# Patient Record
Sex: FEMALE | Race: WHITE
[De-identification: names, ages, dates, MRNs, and addresses within clinical notes are randomized per-mention and may not be internally consistent; named-entity substitution may affect disease eponyms.]

---

## 2017-01-09 ENCOUNTER — HOSPITAL ENCOUNTER (EMERGENCY)
Dept: HOSPITAL 10 - FTE | Age: 42
Discharge: HOME | End: 2017-01-09
Payer: COMMERCIAL

## 2017-01-09 VITALS
WEIGHT: 202.83 LBS | HEIGHT: 68 IN | HEIGHT: 68 IN | BODY MASS INDEX: 30.74 KG/M2 | WEIGHT: 202.83 LBS | BODY MASS INDEX: 30.74 KG/M2

## 2017-01-09 VITALS
TEMPERATURE: 100.3 F | RESPIRATION RATE: 20 BRPM | DIASTOLIC BLOOD PRESSURE: 67 MMHG | SYSTOLIC BLOOD PRESSURE: 132 MMHG | HEART RATE: 88 BPM

## 2017-01-09 DIAGNOSIS — F17.210: ICD-10-CM

## 2017-01-09 DIAGNOSIS — J02.0: Primary | ICD-10-CM

## 2017-01-09 PROCEDURE — 99283 EMERGENCY DEPT VISIT LOW MDM: CPT

## 2017-01-09 PROCEDURE — 81003 URINALYSIS AUTO W/O SCOPE: CPT

## 2017-01-09 NOTE — ERD
ER Documentation


Chief Complaint


Date/Time


DATE: 1/9/17 


TIME: 22:08


Chief Complaint


SORE THROAT SINCE FRIDAY. BOTH KIDS WITH STREP LAST WEEK





HPI


Patient is a 41-year-old female who presents to the emergency department with a 

sore throat 4 days. Patient states that her throat appears erythematous and 

she is having dysphagia. Patient is able to tolerate by mouth fluids. She has 

no muffled voice, no trismus, no drooling and is speaking in full sentences. 

Patient reports of fever which started yesterday. She reports a T max of 101.2F 

this morning around 8 AM. She took ibuprofen 800 mg and hydrocodone at that 

time. Patient denies any nausea, vomiting, abdominal pain, diarrhea, ear pain, 

cough. She does report some malodorous urine 2 days. Patient denies any 

frequency, urgency. Patient denies any recent travel. Patient's son was 

diagnosed with strep throat last week.





ROS


All systems reviewed and are negative except as per history of present illness.





Medications


Home Meds


Active Scripts


Acetaminophen* (Tylophen*) 500 Mg Capsule, 1 CAP PO Q6H Y for PAIN AND OR 

ELEVATED TEMP, #20 CAP


   Prov:YAYO HILLIARD PA-C         1/9/17


Amoxicillin* (Amoxicillin*) 500 Mg Cap, 500 MG PO BID for 10 Days, CAP


   Prov:YAYO HILLIARD PA-C         1/9/17


Ondansetron Hcl* (Zofran* ODT) 4 mg -ODT Tab.disper, 4 MG PO Q8 Y for NAUSEA 

AND OR VOMITING, #30 TAB


   Prov:KASSIDY CALL NP         1/20/16


Diazepam* (Diazepam*) 5 Mg Tablet, 5 MG PO Q8 Y for dizziness vertigo symptoms, 

#10 TAB


   Prov:KASSIDY CALL NP         1/20/16


Reported Medications


Ondansetron Hcl* (Zofran* ODT) Unknown Strength Tab.disper, PO Q6 Y for NAUSEA 

AND/OR VOMITING, #10 TAB


   1/20/16


Meclizine Hcl* (Antivert*) Unknown Strength Tablet, PO Q6H for DIZZINESS, #20 

TAB


   1/20/16


[Prenatal Vits]   No Conflict Check


   1/10/13





Allergies


Allergies:  


Coded Allergies:  


     morphine (Verified  Allergy, Mild, ITCHING, 1/17/14)





PMhx/Soc


Medical and Surgical Hx:  pt denies Medical Hx


History of Surgery:  Yes (C section x3, and gall bladder removal)


Anesthesia Reaction:  No


Hx Neurological Disorder:  No


Hx Respiratory Disorders:  No


Hx Cardiac Disorders:  No


Hx Psychiatric Problems:  No


Hx Miscellaneous Medical Probl:  No


Hx Alcohol Use:  Yes


Hx Substance Use:  No


Hx Tobacco Use:  Yes


Smoking Status:  Current every day smoker





Physical Exam


Vitals





Vital Signs








  Date Time  Temp Pulse Resp B/P Pulse Ox O2 Delivery O2 Flow Rate FiO2


 


1/9/17 23:16 100.3 88 20 132/67 98 Room Air  


 


1/9/17 19:55 100.6 97 18 166/80 100   








Physical Exam


GENERAL: Well-developed, well-nourished female. Appears in no acute distress. 

Speaking in full sentences without any difficulty.


HEAD: Normocephalic, atraumatic. No deformities or ecchymosis. 


EYE: Pupils equal, round, and reactive to light. EOMs intact. No conjunctival 

erythema. No scleral icterus. No eye discharge. 


ENT: External ear without any masses or tenderness. Auditory canals clear 

bilaterally. TM visualized bilaterally, non-erythematous, non-bulging. Nasal 

mucosa pink with no discharge. Oropharynx is erythematous with tonsillar 

swelling bilaterally. Left tonsil with exudates. No uvula deviation. No kissing 

tonsils. No mastoid tenderness bilateral.


NECK: Supple. No lymphadenopathy or thyromegaly. No meningismus. Trachea 

midline.


LUNG: Clear to auscultation bilaterally. No rhonchi, wheezing, rales or coarse 

breath sounds. 


HEART: Regular rate and rhythm. No murmurs, rubs or gallops.


ABDOMEN: Soft, nondistended. Tender to palpation in the suprapubic region. 

Positive bowel sounds in all four quadrants. No rebound tenderness, no 

guarding. (-) McBurney's point tenderness.  No CVA tenderness.


BACK: No midline tenderness. 


EXTREMITIES: Equal pulses bilaterally. No peripheral clubbing, cyanosis or 

edema. No unilateral leg swelling.  


NEUROLOGIC: Alert and oriented to person, place and time. Moving all four 

extremities. 5/5 strength in all extremities. Normal speech. Steady gait. (-) 

Brudzinski sign- no flexion of the hips and knees noted with neck flexion. (-) 

Kernigs sign- patient able to extend knee to 180 degrees with hip flexion, no 

hamstring stiffness noted. 


SKIN: Normal color. Warm and dry. No rashes or lesions.


Results 24 hrs





 Laboratory Tests








Test


  1/9/17


22:56


 


Bedside Urine Blood Trace-intact 


 


Bedside Urine Glucose (UA) Negative 


 


Bedside Urine Ketones (LAB) Negative 


 


Bedside Urine Leukocyte


Esterase (L Negative 


 


 


Bedside Urine Nitrite (LAB) Negative 


 


Bedside Urine Protein (LAB) Negative 


 


Bedside Urine pH (LAB) 7.5 








 Current Medications








 Medications


  (Trade)  Dose


 Ordered  Sig/Zeke


 Route


 PRN Reason  Start Time


 Stop Time Status Last Admin


Dose Admin


 


 Acetaminophen


  (Tylenol Tab)  1,000 mg  ONCE  STAT


 PO


   1/9/17 22:04


 1/9/17 22:51 DC  


 











Procedures/MDM


ED COURSE:


The patient was stable throughout ED course. I kept the patient and/or family 

informed of laboratory and diagnostic imaging results throughout the ED course.

  








MEDICAL DECISION MAKING:


This is a 41-year-old female who presents with throat pain and fevers. Vital 

signs were reviewed. Patient had a temperature of 100.6F at initial 

presentation. Patient was offered Tylenol here in the emergency department for 

her fever but the patient refused stating that she can take the medication at 

home. Patient was not hypoxic. The patient does not have trismus, muffled voice

, uvula deviation, unilateral tonsillar swelling, or drooling. No signs of neck 

swelling or hyperextension of the neck noted. ENT exam revealed erythematous 

oropharynx with bilateral tonsillar erythema and swelling, left tonsil noted to 

have exudates. Given these findings, the patients presentation is most 

consistent with strep pharyngitis. I have a much lower clinical suspicion for 

epiglottitis, peritonsillar abscess, retropharyngeal abscess, Ludwigs angina, 

acute bronchitis, dental abscess, influenza or pneumonia. Urine dip was 

negative for infection or hematuria. Low suspicion for UTI or pyelonephritis. 








PRESCRIPTIONS:


Amoxicillin. Patient advised to take full course of antibiotics. 


Tylenol for pain/fever.





DISCHARGE:


At this time, patient is stable for discharge and outpatient management. 

Supportive therapies such as OTC throat lozenges and warm salt water gurgles 

were discussed. I have instructed the patient to follow-up with his/her primary 

care physician in 1-2 days. I have discussed with the patient the possibility 

of needing to see a specialist for further workup and imaging studies if 

symptoms persist. I have instructed the patient to promptly return to the ER 

for any new or worsening symptoms including increased pain, fever, nausea, 

vomiting, weakness or LOC. The patient and/or family expressed understanding of 

and agreement with this plan. All questions were answered. Home care 

instructions were provided.





Departure


Diagnosis:  


 Primary Impression:  


 Strep throat


Condition:  Stable


Patient Instructions:  Pharyngitis, Strep (Presumed)





Additional Instructions:  


They have access prescribed. Drink lots of fluids. Take Tylenol for fever/pain 

control.





Call your primary care doctor TOMORROW for an appointment during the next 1-2 

days.See the doctor sooner or return here if your condition worsens before your 

appointment time.











YAYO HILLIARD PA-C Jan 9, 2017 22:16

## 2017-04-12 ENCOUNTER — HOSPITAL ENCOUNTER (EMERGENCY)
Dept: HOSPITAL 10 - FTE | Age: 42
Discharge: HOME | End: 2017-04-12
Payer: COMMERCIAL

## 2017-04-12 VITALS — BODY MASS INDEX: 45.22 KG/M2 | WEIGHT: 195.42 LBS | HEIGHT: 55 IN

## 2017-04-12 DIAGNOSIS — M25.522: Primary | ICD-10-CM

## 2017-04-12 DIAGNOSIS — Z04.3: ICD-10-CM

## 2017-04-12 PROCEDURE — 73080 X-RAY EXAM OF ELBOW: CPT

## 2017-04-12 PROCEDURE — 96372 THER/PROPH/DIAG INJ SC/IM: CPT

## 2017-04-12 NOTE — ERD
ER Documentation


Chief Complaint


Date/Time


DATE: 17 


TIME: 14:48


Chief Complaint


l. elbow pain s/p fall





HPI


Patient is a 41-year-old female who presents to the ED with left elbow pain 

after sustaining a fall 1 week ago.  She states that she has pain to the tip of 

her elbow.  She states that it was swollen but the swelling has decreased.  She 

has been using ice.  She is able to move her her elbow and shoulder but she 

states that it is tender to touch when she hits her elbow or places her elbow 

on a hard surface.  Denies numbness or tingling.  Denies pain above or below 

her elbow joint.  Denies fever or chills.  She states that she took half a 

tablet of hydrocodone which helped with her symptoms.  Up-to-date with her 

immunizations no other complaints.





ROS


All systems reviewed and are negative except as per history of present illness.





Medications


Home Meds


Active Scripts


Naproxen* (Naprosyn*) 500 Mg Tablet, 500 MG PO BID Y for PAIN AND/OR 

INFLAMMATION, #30 TAB


   Prov:JALEN ADAIR PA-C         17


Acetaminophen* (Tylophen*) 500 Mg Capsule, 1 CAP PO Q6H Y for PAIN AND OR 

ELEVATED TEMP, #20 CAP


   Prov:YAYO HILLIARD PA-C         17


Amoxicillin* (Amoxicillin*) 500 Mg Cap, 500 MG PO BID for 10 Days, CAP


   Prov:YAYO HILLIARD PA-C         17


Ondansetron Hcl* (Zofran* ODT) 4 mg -ODT Tab.disper, 4 MG PO Q8 Y for NAUSEA 

AND OR VOMITING, #30 TAB


   Prov:KASSIDY CALL NP         16


Diazepam* (Diazepam*) 5 Mg Tablet, 5 MG PO Q8 Y for dizziness vertigo symptoms, 

#10 TAB


   Prov:KASSIDY CALL NP         16


Reported Medications


Ondansetron Hcl* (Zofran* ODT) Unknown Strength Tab.disper, PO Q6 Y for NAUSEA 

AND/OR VOMITING, #10 TAB


   16


Meclizine Hcl* (Antivert*) Unknown Strength Tablet, PO Q6H for DIZZINESS, #20 

TAB


   16


[Prenatal Vits]   No Conflict Check


   1/10/13





Allergies


Allergies:  


Coded Allergies:  


     morphine (Verified  Allergy, Mild, ITCHING, 14)





PMhx/Soc


History of Surgery:  Yes (C section x3, and gall bladder removal)


Anesthesia Reaction:  No


Hx Neurological Disorder:  No


Hx Respiratory Disorders:  No


Hx Cardiac Disorders:  No


Hx Psychiatric Problems:  No


Hx Miscellaneous Medical Probl:  No


Hx Alcohol Use:  Yes


Hx Substance Use:  No


Hx Tobacco Use:  Yes


Smoking Status:  Never smoker





FmHx


Family History:  No coronary disease, No diabetes, No other





Physical Exam


Vitals





Vital Signs








  Date Time  Temp Pulse Resp B/P Pulse Ox O2 Delivery O2 Flow Rate FiO2


 


17 13:42 98.7 82 20 131/76 100   








Physical Exam





GENERAL: Well-developed, well-nourished female. Appears in no acute distress. 


LUNG: Clear to auscultation bilaterally. No rhonchi, wheezing, rales or coarse 

breath sounds. 


HEART: Regular rate and rhythm. No murmurs, rubs or gallops.


ABDOMEN: No scars, ecchymosis or rashes noted. Soft, nontender, and 

nondistended. Positive bowel sounds in all four quadrants. No rebound tenderness

, no guarding. (-) McBurneys point tenderness. No CVA tenderness. 


BACK: No midline tenderness. 


Extremities: Equal pulses bilaterally. No peripheral clubbing, cyanosis or 

edema. No unilateral leg swelling.  No step-offs or deformities.  No open 

wounds or lacerations.  Tenderness to the olecranon.  No bruising or ecchymosis 

or redness or warmth.  No pain above or below the elbow joint.  No snuffbox 

tenderness.  Range of motion intact.  Pulses intact.  Radius, ulnar and median 

nerve intact.


NEUROLOGIC: Alert and oriented. Moving all four extremities. 5/5 strength in 

all extremities. Normal speech. Steady gait. 


SKIN: Normal color. Warm and dry. No rashes or lesions. Capillary refill < 2 

seconds


Results 24 hrs





 Current Medications








 Medications


  (Trade)  Dose


 Ordered  Sig/Zeke


 Route


 PRN Reason  Start Time


 Stop Time Status Last Admin


Dose Admin


 


 Ketorolac


 Tromethamine


  (Toradol)  30 mg  ONCE  STAT


 IM


   17 13:56


 17 13:58 DC 17 14:10


 











Procedures/MDM


ER COURSE:


I kept the patient and/or family informed of laboratory and diagnostic imaging 

results throughout the emergency room course.





MEDICATIONS


Toradol.  Tolerated well with no adverse reaction.





IMAGING STUDIES


 [place] [id number]


 Radiology Main Line: [phone]





 DIAGNOSTIC IMAGING REPORT





 Patient: LILY STRICKLAND   : 1975   Age: 41  Sex: F                  

      


 MR #:    [id number]   Acct #:   [id number]    DOS: 17 1356


 Ordering MD: JALEN ADAIR PA-C   Location:  FTE   Room/Bed:           

                                 


 








PROCEDURE:   XR left elbow. 


 


CLINICAL INDICATION:   Trauma due to a fall.  Left elbow pain. 


 


TECHNIQUE:   3 views.  Frontal, lateral, and oblique.


 


COMPARISON:   No prior study is available for comparison.   


 


FINDINGS:


There is no fracture or dislocation.


The soft tissues are normal.


Articular surfaces are intact.


There is no lytic or blastic lesion.


There is no radiopaque foreign body. 


 


IMPRESSION:


1.  Unremarkable images of the left elbow.  


 


RPTAT: QQ


_____________________________________________ 


.Josafat Gomez MD, MD           Date    Time 


Electronically viewed and signed by .Josafat Gomez MD, MD on 2017 15:15 


 


D:  2017 15:15  T:  2017 15:15


.R/





CC: JALEN ADAIR PA-C








MEDICAL DECISION MAKING:


This is a 41-year-old female who presents with left elbow pain 1 week. Vital 

signs were reviewed. Patient is afebrile. Patient is not hypoxic.  Patient is 

not toxic or ill-appearing.  Patient has elbow pain of unknown etiology likely 

contusion.  Low suspicion for dislocation, fracture, septic joint, compartment 

syndrome, osteomyelitis, cellulitis, avascular necrosis, neurological injury, 

vascular injury, tendon laceration.  [Ace wrap Assessment: Neurovascularly 

intact post ace wrap placement with good fit.]


Patient's extremity symptoms have stabilized while they have been evaluated in 

the department and are appropriate for outpatient follow up.











DISCHARGE:


At this time, patient is stable for discharge and outpatient management with no 

new complaints during the ER course. Patient was sent home with Naprosyn for 

pain and a copy of her imaging results an Ace wrap. Patient will be discharged 

home with instructions to recheck for new or worsening symptoms such as fever, 

nausea, weakness, LOC and to follow up with primary care in the next 1-2 days. 

Patient was advised to return to the ER for any new or worsening symptoms. Plan 

was discussed and patient and/or family understands and agrees. Home 

instructions were given.





Departure


Diagnosis:  


 Primary Impression:  


 Elbow pain


 Laterality:  left  Qualified Code:  M25.522 - Left elbow pain


Condition:  Stable











JALEN ADAIR PA-C 2017 14:52

## 2017-04-12 NOTE — RADRPT
PROCEDURE:   XR left elbow. 

 

CLINICAL INDICATION:   Trauma due to a fall.  Left elbow pain. 

 

TECHNIQUE:   3 views.  Frontal, lateral, and oblique.

 

COMPARISON:   No prior study is available for comparison.   

 

FINDINGS:

There is no fracture or dislocation.

The soft tissues are normal.

Articular surfaces are intact.

There is no lytic or blastic lesion.

There is no radiopaque foreign body. 

 

IMPRESSION:

1.  Unremarkable images of the left elbow.  

 

RPTAT: QQ

_____________________________________________ 

.Josafat Gomez MD, MD           Date    Time 

Electronically viewed and signed by .Josafat Gomez MD, MD on 04/12/2017 15:15 

 

D:  04/12/2017 15:15  T:  04/12/2017 15:15

.R/

## 2017-10-27 ENCOUNTER — HOSPITAL ENCOUNTER (EMERGENCY)
Dept: HOSPITAL 10 - FTE | Age: 42
Discharge: HOME | End: 2017-10-27
Payer: COMMERCIAL

## 2017-10-27 VITALS — DIASTOLIC BLOOD PRESSURE: 74 MMHG | HEART RATE: 67 BPM | RESPIRATION RATE: 15 BRPM | SYSTOLIC BLOOD PRESSURE: 126 MMHG

## 2017-10-27 VITALS — WEIGHT: 180.78 LBS | HEIGHT: 55 IN | BODY MASS INDEX: 41.84 KG/M2

## 2017-10-27 DIAGNOSIS — H81.10: Primary | ICD-10-CM

## 2017-10-27 DIAGNOSIS — F17.210: ICD-10-CM

## 2017-10-27 PROCEDURE — 99282 EMERGENCY DEPT VISIT SF MDM: CPT

## 2017-10-27 NOTE — ERD
ER Documentation


Chief Complaint


Chief Complaint


vertigo since 10/21 worse with driving and moving head





HPI


40 y/o female with well know and evaluated history of vertigo causing 

disability status, presents to the ED asking if there is something new or 

different in the management of vertigo since she feels that the symptoms are 

slowly getting worse. Her neurologist has her on Diazepam and Meclizine, she 

doesn't need a refill now. Her studies include MRI's and Vestibular evaluation 

at [facility]. At this time, the patient denies headache, no fever, no visual changes

, no trauma.





ROS


All systems reviewed and are negative except as per history of present illness.





Medications


Home Meds


Active Scripts


Naproxen* (Naprosyn*) 500 Mg Tablet, 500 MG PO BID Y for PAIN AND/OR 

INFLAMMATION, #30 TAB


   Prov:JALEN ADAIRC         4/12/17


Acetaminophen* (Tylophen*) 500 Mg Capsule, 1 CAP PO Q6H Y for PAIN AND OR 

ELEVATED TEMP, #20 CAP


   Prov:YAYO HILLIARD PA-C         1/9/17


Amoxicillin* (Amoxicillin*) 500 Mg Cap, 500 MG PO BID for 10 Days, CAP


   Prov:YAYO HILLIARDC         1/9/17


Ondansetron Hcl* (Zofran* ODT) 4 mg -ODT Tab.disper, 4 MG PO Q8 Y for NAUSEA 

AND OR VOMITING, #30 TAB


   Prov:KASSIDY CALL NP         1/20/16


Diazepam* (Diazepam*) 5 Mg Tablet, 5 MG PO Q8 Y for dizziness vertigo symptoms, 

#10 TAB


   Prov:KASSIDY CALL NP         1/20/16


Reported Medications


Ondansetron Hcl* (Zofran* ODT) Unknown Strength Tab.disper, PO Q6 Y for NAUSEA 

AND/OR VOMITING, #10 TAB


   1/20/16


Meclizine Hcl* (Antivert*) Unknown Strength Tablet, PO Q6H for DIZZINESS, #20 

TAB


   1/20/16


[Prenatal Vits]   No Conflict Check


   1/10/13





Allergies


Allergies:  


Coded Allergies:  


     morphine (Verified  Allergy, Mild, ITCHING, 1/17/14)





PMhx/Soc


History of Surgery:  Yes (C section x3, and gall bladder removal)


Anesthesia Reaction:  No


Hx Neurological Disorder:  No


Hx Respiratory Disorders:  No


Hx Cardiac Disorders:  No


Hx Psychiatric Problems:  No


Hx Miscellaneous Medical Probl:  No


Hx Alcohol Use:  Yes (occas)


Hx Substance Use:  No


Hx Tobacco Use:  Yes


Smoking Status:  Current every day smoker





Physical Exam


Vitals





Vital Signs








  Date Time  Temp Pulse Resp B/P Pulse Ox O2 Delivery O2 Flow Rate FiO2


 


10/27/17 14:00  67 15 126/74 99 Room Air  


 


10/27/17 12:45 99.0 92 20 128/80 99   








Physical Exam


Const:  Alert, oriented in no distress


Head:   Atraumatic 


Eyes:    Normal Conjunctiva


ENT:    Normal External Ears, Nose and Mouth.


Neck:               Full range of motion..~ No meningismus.


Resp:    Clear to auscultation bilaterally


Cardio:    Regular rate and rhythm, no murmurs


Abd:    Soft, non tender, non distended. Normal bowel sounds


Skin:    No petechiae or rashes


Back:    No midline or flank tenderness


Ext:    No cyanosis, or edema


Neur:    Awake and alert


Psych:    Normal Mood and Affect





Procedures/MDM


40 y/o female with long history of vertigo causing disability, presents c/o 

slow progression of her symptoms, and is inquiring about new treatment. 

Physical exam and vital signs unremarkable. I recommend to continue management 

and follow up with her neurologist. Patient agrees with expectant management at 

this time and thanks us for revising her case.





Departure


Diagnosis:  


 Primary Impression:  


 Benign positional vertigo


 Additional Impression:  


 Postural dizziness


Condition:  Stable





Additional Instructions:  


Thank you very much for allowing us to participate in your care. It was a 

pleasure seen you today here at [facility].


Please continue taking your medication for chronic vertigo: Valium and 

meclizine.  Please do not drive while you are taking these medications and 

follow-up with primary care provider.  If illness has not improved in 2 days, 

then make an appointment with primary care provider, if the provider is 

unavailable, return to the Emergency Department immediately.











LINO DONAHUE MD Oct 27, 2017 13:44

## 2017-10-27 NOTE — ERD
ER Documentation


Chief Complaint


Chief Complaint


vertigo since 10/21 worse with driving and moving head





HPI


40 y/o female with well know and evaluated history of vertigo causing 

disability status, presents to the ED asking if there is something new or 

different in the management of vertigo since she feels that the symptoms are 

slowly getting worse. Her neurologist has her on Diazepam and Meclizine, she 

doesn't need a refill now. Her studies include MRI's and Vestibular evaluation 

at [facility]. At this time, the patient denies headache, no fever, no visual changes

, no trauma.





ROS


All systems reviewed and are negative except as per history of present illness.





Medications


Home Meds


Active Scripts


Naproxen* (Naprosyn*) 500 Mg Tablet, 500 MG PO BID Y for PAIN AND/OR 

INFLAMMATION, #30 TAB


   Prov:JALEN ADAIRC         4/12/17


Acetaminophen* (Tylophen*) 500 Mg Capsule, 1 CAP PO Q6H Y for PAIN AND OR 

ELEVATED TEMP, #20 CAP


   Prov:YAYO HILLIARD PA-C         1/9/17


Amoxicillin* (Amoxicillin*) 500 Mg Cap, 500 MG PO BID for 10 Days, CAP


   Prov:YAYO HILLIARDC         1/9/17


Ondansetron Hcl* (Zofran* ODT) 4 mg -ODT Tab.disper, 4 MG PO Q8 Y for NAUSEA 

AND OR VOMITING, #30 TAB


   Prov:KASSIDY CALL NP         1/20/16


Diazepam* (Diazepam*) 5 Mg Tablet, 5 MG PO Q8 Y for dizziness vertigo symptoms, 

#10 TAB


   Prov:KASSIDY CALL NP         1/20/16


Reported Medications


Ondansetron Hcl* (Zofran* ODT) Unknown Strength Tab.disper, PO Q6 Y for NAUSEA 

AND/OR VOMITING, #10 TAB


   1/20/16


Meclizine Hcl* (Antivert*) Unknown Strength Tablet, PO Q6H for DIZZINESS, #20 

TAB


   1/20/16


[Prenatal Vits]   No Conflict Check


   1/10/13





Allergies


Allergies:  


Coded Allergies:  


     morphine (Verified  Allergy, Mild, ITCHING, 1/17/14)





PMhx/Soc


History of Surgery:  Yes (C section x3, and gall bladder removal)


Anesthesia Reaction:  No


Hx Neurological Disorder:  No


Hx Respiratory Disorders:  No


Hx Cardiac Disorders:  No


Hx Psychiatric Problems:  No


Hx Miscellaneous Medical Probl:  No


Hx Alcohol Use:  Yes (occas)


Hx Substance Use:  No


Hx Tobacco Use:  Yes


Smoking Status:  Current every day smoker





Physical Exam


Vitals





Vital Signs








  Date Time  Temp Pulse Resp B/P Pulse Ox O2 Delivery O2 Flow Rate FiO2


 


10/27/17 14:00  67 15 126/74 99 Room Air  


 


10/27/17 12:45 99.0 92 20 128/80 99   








Physical Exam


Const:  Alert, oriented in no distress


Head:   Atraumatic 


Eyes:    Normal Conjunctiva


ENT:    Normal External Ears, Nose and Mouth.


Neck:               Full range of motion..~ No meningismus.


Resp:    Clear to auscultation bilaterally


Cardio:    Regular rate and rhythm, no murmurs


Abd:    Soft, non tender, non distended. Normal bowel sounds


Skin:    No petechiae or rashes


Back:    No midline or flank tenderness


Ext:    No cyanosis, or edema


Neur:    Awake and alert


Psych:    Normal Mood and Affect





Procedures/MDM


40 y/o female with long history of vertigo causing disability, presents c/o 

slow progression of her symptoms, and is inquiring about new treatment. 

Physical exam and vital signs unremarkable. I recommend to continue management 

and follow up with her neurologist. Patient agrees with expectant management at 

this time and thanks us for revising her case.





Departure


Diagnosis:  


 Primary Impression:  


 Benign positional vertigo


 Additional Impression:  


 Postural dizziness


Condition:  Stable





Additional Instructions:  


Thank you very much for allowing us to participate in your care. It was a 

pleasure seen you today here at [facility].


Please continue taking your medication for chronic vertigo: Valium and 

meclizine.  Please do not drive while you are taking these medications and 

follow-up with primary care provider.  If illness has not improved in 2 days, 

then make an appointment with primary care provider, if the provider is 

unavailable, return to the Emergency Department immediately.











LINO DONAHUE MD Oct 27, 2017 13:44

## 2018-04-09 ENCOUNTER — HOSPITAL ENCOUNTER (EMERGENCY)
Dept: HOSPITAL 91 - FTE | Age: 43
Discharge: HOME | End: 2018-04-09
Payer: COMMERCIAL

## 2018-04-09 ENCOUNTER — HOSPITAL ENCOUNTER (EMERGENCY)
Age: 43
Discharge: HOME | End: 2018-04-09

## 2018-04-09 DIAGNOSIS — R11.10: ICD-10-CM

## 2018-04-09 DIAGNOSIS — F17.210: ICD-10-CM

## 2018-04-09 DIAGNOSIS — R10.9: Primary | ICD-10-CM

## 2018-04-09 LAB
ABNORMAL IP MESSAGE: 1
ADD MAN DIFF?: NO
ADD UMIC: YES
ALANINE AMINOTRANSFERASE: 35 IU/L (ref 13–69)
ALBUMIN/GLOBULIN RATIO: 1.46
ALBUMIN: 3.8 G/DL (ref 3.3–4.9)
ALKALINE PHOSPHATASE: 41 IU/L (ref 42–121)
ANION GAP: 13 (ref 8–16)
ASPARTATE AMINO TRANSFERASE: 20 IU/L (ref 15–46)
BASOPHIL #: 0 10^3/UL (ref 0–0.1)
BASOPHILS %: 0.2 % (ref 0–2)
BILIRUBIN,DIRECT: 0 MG/DL (ref 0–0.2)
BILIRUBIN,TOTAL: 0.5 MG/DL (ref 0.2–1.3)
BLOOD UREA NITROGEN: 13 MG/DL (ref 7–20)
CALCIUM: 8.7 MG/DL (ref 8.4–10.2)
CARBON DIOXIDE: 26 MMOL/L (ref 21–31)
CHLORIDE: 103 MMOL/L (ref 97–110)
CREATININE: 0.63 MG/DL (ref 0.44–1)
EOSINOPHILS #: 0 10^3/UL (ref 0–0.5)
EOSINOPHILS %: 0 % (ref 0–7)
GLOBULIN: 2.6 G/DL (ref 1.3–3.2)
GLUCOSE: 110 MG/DL (ref 70–220)
HEMATOCRIT: 40.1 % (ref 37–47)
HEMOGLOBIN: 13.4 G/DL (ref 12–16)
LIPASE: 40 U/L (ref 23–300)
LYMPHOCYTES #: 0.6 10^3/UL (ref 0.8–2.9)
LYMPHOCYTES %: 12.4 % (ref 15–51)
MEAN CORPUSCULAR HEMOGLOBIN: 27 PG (ref 29–33)
MEAN CORPUSCULAR HGB CONC: 33.4 G/DL (ref 32–37)
MEAN CORPUSCULAR VOLUME: 80.7 FL (ref 82–101)
MEAN PLATELET VOLUME: 10.8 FL (ref 7.4–10.4)
MONOCYTE #: 0.2 10^3/UL (ref 0.3–0.9)
MONOCYTES %: 4.4 % (ref 0–11)
NEUTROPHIL #: 3.8 10^3/UL (ref 1.6–7.5)
NEUTROPHILS %: 82.8 % (ref 39–77)
NUCLEATED RED BLOOD CELLS #: 0 10^3/UL (ref 0–0)
NUCLEATED RED BLOOD CELLS%: 0 /100WBC (ref 0–0)
PLATELET COUNT: 157 10^3/UL (ref 140–415)
POSITIVE DIFF: (no result)
POTASSIUM: 3.6 MMOL/L (ref 3.5–5.1)
RED BLOOD COUNT: 4.97 10^6/UL (ref 4.2–5.4)
RED CELL DISTRIBUTION WIDTH: 15.3 % (ref 11.5–14.5)
SODIUM: 138 MMOL/L (ref 135–144)
TOTAL PROTEIN: 6.4 G/DL (ref 6.1–8.1)
UR BILIRUBIN (DIP): (no result) MG/DL
UR BLOOD (DIP): NEGATIVE MG/DL
UR CLARITY: CLEAR
UR COLOR: YELLOW
UR GLUCOSE (DIP): NEGATIVE MG/DL
UR KETONES (DIP): (no result) MG/DL
UR LEUKOCYTE ESTERASE (DIP): NEGATIVE LEU/UL
UR NITRITE (DIP): NEGATIVE MG/DL
UR SQUAMOUS EPITHELIAL CELL: (no result) /HPF
UR TOTAL PROTEIN (DIP): (no result) MG/DL
UR UROBILINOGEN (DIP): (no result) MG/DL
URINE PH (DIP): >9 (ref 5–9)
URINE RBCS: (no result) /HPF
URINE SPECIFIC GRAVITY (DIP): 1.01 (ref 1–1.03)
WHITE BLOOD COUNT: 4.6 10^3/UL (ref 4.8–10.8)

## 2018-04-09 PROCEDURE — 96374 THER/PROPH/DIAG INJ IV PUSH: CPT

## 2018-04-09 PROCEDURE — 36415 COLL VENOUS BLD VENIPUNCTURE: CPT

## 2018-04-09 PROCEDURE — 81001 URINALYSIS AUTO W/SCOPE: CPT

## 2018-04-09 PROCEDURE — 96375 TX/PRO/DX INJ NEW DRUG ADDON: CPT

## 2018-04-09 PROCEDURE — 85025 COMPLETE CBC W/AUTO DIFF WBC: CPT

## 2018-04-09 PROCEDURE — 80053 COMPREHEN METABOLIC PANEL: CPT

## 2018-04-09 PROCEDURE — 99284 EMERGENCY DEPT VISIT MOD MDM: CPT

## 2018-04-09 PROCEDURE — 83690 ASSAY OF LIPASE: CPT

## 2018-04-09 RX ADMIN — HYDROMORPHONE HYDROCHLORIDE 1 MG: 1 INJECTION, SOLUTION INTRAMUSCULAR; INTRAVENOUS; SUBCUTANEOUS at 02:34

## 2018-04-09 RX ADMIN — FAMOTIDINE 1 MG: 10 INJECTION, SOLUTION INTRAVENOUS at 01:27

## 2018-04-09 RX ADMIN — ONDANSETRON HYDROCHLORIDE 1 MG: 2 INJECTION, SOLUTION INTRAMUSCULAR; INTRAVENOUS at 01:27

## 2018-04-09 RX ADMIN — THIAMINE HYDROCHLORIDE 1 MLS/HR: 100 INJECTION, SOLUTION INTRAMUSCULAR; INTRAVENOUS at 01:27

## 2019-04-13 ENCOUNTER — HOSPITAL ENCOUNTER (EMERGENCY)
Dept: HOSPITAL 10 - FTE | Age: 44
Discharge: HOME | End: 2019-04-13
Payer: COMMERCIAL

## 2019-04-13 ENCOUNTER — HOSPITAL ENCOUNTER (EMERGENCY)
Dept: HOSPITAL 91 - FTE | Age: 44
Discharge: HOME | End: 2019-04-13
Payer: COMMERCIAL

## 2019-04-13 VITALS — HEART RATE: 71 BPM | RESPIRATION RATE: 18 BRPM | DIASTOLIC BLOOD PRESSURE: 66 MMHG | SYSTOLIC BLOOD PRESSURE: 147 MMHG

## 2019-04-13 VITALS
BODY MASS INDEX: 28.75 KG/M2 | WEIGHT: 183.2 LBS | BODY MASS INDEX: 28.75 KG/M2 | HEIGHT: 67 IN | HEIGHT: 67 IN | WEIGHT: 183.2 LBS

## 2019-04-13 DIAGNOSIS — R10.13: Primary | ICD-10-CM

## 2019-04-13 DIAGNOSIS — F17.210: ICD-10-CM

## 2019-04-13 LAB
ADD MAN DIFF?: NO
ADD UMIC: YES
ALANINE AMINOTRANSFERASE: 18 IU/L (ref 13–69)
ALBUMIN/GLOBULIN RATIO: 1.32
ALBUMIN: 4.5 G/DL (ref 3.3–4.9)
ALKALINE PHOSPHATASE: 64 IU/L (ref 42–121)
ANION GAP: 8 (ref 5–13)
ASPARTATE AMINO TRANSFERASE: 21 IU/L (ref 15–46)
BASOPHIL #: 0 10^3/UL (ref 0–0.1)
BASOPHILS %: 0.3 % (ref 0–2)
BILIRUBIN,DIRECT: 0 MG/DL (ref 0–0.2)
BILIRUBIN,TOTAL: 0.5 MG/DL (ref 0.2–1.3)
BLOOD UREA NITROGEN: 15 MG/DL (ref 7–20)
CALCIUM: 9.6 MG/DL (ref 8.4–10.2)
CARBON DIOXIDE: 30 MMOL/L (ref 21–31)
CHLORIDE: 103 MMOL/L (ref 97–110)
CREATININE: 0.63 MG/DL (ref 0.44–1)
EOSINOPHILS #: 0.3 10^3/UL (ref 0–0.5)
EOSINOPHILS %: 4.2 % (ref 0–7)
GLOBULIN: 3.4 G/DL (ref 1.3–3.2)
GLUCOSE: 89 MG/DL (ref 70–220)
HEMATOCRIT: 38.3 % (ref 37–47)
HEMOGLOBIN: 12.2 G/DL (ref 12–16)
IMMATURE GRANS #M: 0.01 10^3/UL (ref 0–0.03)
IMMATURE GRANS % (M): 0.2 % (ref 0–0.43)
LIPASE: 77 U/L (ref 23–300)
LYMPHOCYTES #: 1.8 10^3/UL (ref 0.8–2.9)
LYMPHOCYTES %: 27.9 % (ref 15–51)
MEAN CORPUSCULAR HEMOGLOBIN: 25.6 PG (ref 29–33)
MEAN CORPUSCULAR HGB CONC: 31.9 G/DL (ref 32–37)
MEAN CORPUSCULAR VOLUME: 80.5 FL (ref 82–101)
MEAN PLATELET VOLUME: 11 FL (ref 7.4–10.4)
MONOCYTE #: 0.5 10^3/UL (ref 0.3–0.9)
MONOCYTES %: 8.5 % (ref 0–11)
NEUTROPHIL #: 3.8 10^3/UL (ref 1.6–7.5)
NEUTROPHILS %: 58.9 % (ref 39–77)
NUCLEATED RED BLOOD CELLS #: 0 10^3/UL (ref 0–0)
NUCLEATED RED BLOOD CELLS%: 0 /100WBC (ref 0–0)
PLATELET COUNT: 215 10^3/UL (ref 140–415)
POTASSIUM: 4.2 MMOL/L (ref 3.5–5.1)
RED BLOOD COUNT: 4.76 10^6/UL (ref 4.2–5.4)
RED CELL DISTRIBUTION WIDTH: 14.3 % (ref 11.5–14.5)
SODIUM: 141 MMOL/L (ref 135–144)
TOTAL PROTEIN: 7.9 G/DL (ref 6.1–8.1)
UR ASCORBIC ACID: NEGATIVE MG/DL
UR BACTERIA: (no result) /HPF
UR BILIRUBIN (DIP): NEGATIVE MG/DL
UR BLOOD (DIP): NEGATIVE MG/DL
UR BUDDING YEAST: (no result) /HPF
UR CLARITY: (no result)
UR COLOR: YELLOW
UR GLUCOSE (DIP): NEGATIVE MG/DL
UR KETONES (DIP): NEGATIVE MG/DL
UR LEUKOCYTE ESTERASE (DIP): NEGATIVE LEU/UL
UR NITRITE (DIP): NEGATIVE MG/DL
UR PH (DIP): 7 (ref 5–9)
UR RBC: 0 /HPF (ref 0–5)
UR SPECIFIC GRAVITY (DIP): 1.01 (ref 1–1.03)
UR SQUAMOUS EPITHELIAL CELL: (no result) /HPF
UR TOTAL PROTEIN (DIP): NEGATIVE MG/DL
UR UROBILINOGEN (DIP): NEGATIVE MG/DL
UR WBC: 0 /HPF (ref 0–5)
WHITE BLOOD COUNT: 6.4 10^3/UL (ref 4.8–10.8)

## 2019-04-13 PROCEDURE — 96374 THER/PROPH/DIAG INJ IV PUSH: CPT

## 2019-04-13 PROCEDURE — 76705 ECHO EXAM OF ABDOMEN: CPT

## 2019-04-13 PROCEDURE — 99285 EMERGENCY DEPT VISIT HI MDM: CPT

## 2019-04-13 PROCEDURE — 85025 COMPLETE CBC W/AUTO DIFF WBC: CPT

## 2019-04-13 PROCEDURE — 80053 COMPREHEN METABOLIC PANEL: CPT

## 2019-04-13 PROCEDURE — 81001 URINALYSIS AUTO W/SCOPE: CPT

## 2019-04-13 PROCEDURE — 83690 ASSAY OF LIPASE: CPT

## 2019-04-13 PROCEDURE — 93005 ELECTROCARDIOGRAM TRACING: CPT

## 2019-04-13 PROCEDURE — 81025 URINE PREGNANCY TEST: CPT

## 2019-04-13 PROCEDURE — 84702 CHORIONIC GONADOTROPIN TEST: CPT

## 2019-04-13 PROCEDURE — 96375 TX/PRO/DX INJ NEW DRUG ADDON: CPT

## 2019-04-13 PROCEDURE — 36415 COLL VENOUS BLD VENIPUNCTURE: CPT

## 2019-04-13 RX ADMIN — HYDROMORPHONE HYDROCHLORIDE 1 MG: 1 INJECTION, SOLUTION INTRAMUSCULAR; INTRAVENOUS; SUBCUTANEOUS at 19:15

## 2019-04-13 RX ADMIN — KETOROLAC TROMETHAMINE 1 MG: 30 INJECTION, SOLUTION INTRAMUSCULAR at 18:39

## 2019-04-13 RX ADMIN — THIAMINE HYDROCHLORIDE 1 MLS/HR: 100 INJECTION, SOLUTION INTRAMUSCULAR; INTRAVENOUS at 18:40

## 2019-04-13 NOTE — ERD
ER Documentation


Chief Complaint


Chief Complaint





MID EPIGASTRIC PAIN HX OF BILIARY STENT AND GALLBLADDER REMOVAL





HPI


This is a 43 female past medical history of cholecystectomy and biliary stent 


presents to the ED complaining of midepigastric abdominal pain.  Patient states 


pain is sudden onset, occurred 5 hours prior to arrival.  Pain radiates to her 


back and associated with mild distention.  Patient also reports waxing and abdulkadir


ng substernal chest pain.  She denies any associated shortness of breath, 


palpitations, diaphoresis, numbness, tingling, focal weakness.  She has been 


taking ibuprofen, Pepcid, Tums without any relief of her symptoms.  Patient 


states she has had this pain intermittently over the past several years however 


today's pain is worse.





ROS


All systems reviewed and are negative except as per history of present illness.





Medications


Home Meds


Active Scripts


Docusate Sodium* (Colace*) 100 Mg Capsule, 100 MG PO TID, #30 CAP


   Prov:BESSYIGRTAHMINA VITAL-C         4/13/19


Hydrocodone/Acetaminophen (Norco 5-325 Tablet) 1 Each Tablet, 1 TAB PO Q6H PRN 


for PAIN, #7 TAB


   Prov:BESSYIGRTAHMINA VITAL-C         4/13/19


Famotidine* (Pepcid*) 20 Mg Tablet, 20 MG PO BID for 4 Days, TAB


   Prov:TAHMINA TOLEDO-C         4/13/19


Hydrocodone/Acetaminophen (Norco 5-325 Tablet) 1 Each Tablet, 1 TAB PO Q6H PRN 


for PAIN, #15 TAB


   Prov:GALEN WESLEY PA-C         4/9/18


Ondansetron (Ondansetron Odt) 4 Mg Tab.rapdis, 4 MG PO Q6H PRN for NAUSEA AND/OR


VOMITING, #20 TAB


   Prov:GALEN WESLEY PA-C         4/9/18


Ibuprofen* (Motrin*) 800 Mg Tab, 800 MG PO Q6, #30 TAB


   Prov:GALEN WESLEY PA-C         4/9/18


Naproxen* (Naprosyn*) 500 Mg Tablet, 500 MG PO BID PRN for PAIN AND/OR 


INFLAMMATION, #30 TAB


   Prov:JALEN ADAIR PA-C         4/12/17


Acetaminophen* (Tylophen*) 500 Mg Capsule, 1 CAP PO Q6H PRN for PAIN AND OR 


ELEVATED TEMP, #20 CAP


   Prov:REJI HILLIARDANDREW TORRES         1/9/17


Amoxicillin* (Amoxicillin*) 500 Mg Cap, 500 MG PO BID for 10 Days, CAP


   Prov:ARMINDAYAYO TORRES         1/9/17


Ondansetron Hcl* (Zofran* ODT) 4 mg -ODT Tab.disper, 4 MG PO Q8 PRN for NAUSEA 


AND OR VOMITING, #30 TAB


   Prov:KASSIDY CALL NP         1/20/16


Diazepam* (Diazepam*) 5 Mg Tablet, 5 MG PO Q8 PRN for dizziness vertigo 


symptoms, #10 TAB


   Prov:KASSIDY CALL NP         1/20/16


Reported Medications


Ondansetron Hcl* (Zofran* ODT) Unknown Strength Tab.disper, PO Q6 PRN for NAUSEA


AND/OR VOMITING, #10 TAB


   1/20/16


Meclizine Hcl* (Antivert*) Unknown Strength Tablet, PO Q6H for DIZZINESS, #20 


TAB


   1/20/16


[Prenatal Vits]   No Conflict Check


   1/10/13





Allergies


Allergies:  


Coded Allergies:  


     morphine (Verified  Allergy, Mild, ITCHING, 1/17/14)





PMhx/Soc


History of Surgery:  Yes (cholecystectomy, c/section)


Anesthesia Reaction:  No


Hx Neurological Disorder:  No


Hx Respiratory Disorders:  No


Hx Cardiac Disorders:  No


Hx Psychiatric Problems:  No


Hx Miscellaneous Medical Probl:  No


Hx Alcohol Use:  Yes


Hx Substance Use:  No


Hx Tobacco Use:  Yes


Smoking Status:  Current every day smoker





Physical Exam


Vitals





Vital Signs


  Date      Temp  Pulse  Resp  B/P (MAP)   Pulse Ox  O2          O2 Flow    FiO2


Time                                                 Delivery    Rate


   4/13/19  98.6     94    17     150/108        99


     16:56                          (122)





Physical Exam


Const:   + Moderate distress secondary to pain.


Head:   Atraumatic 


Eyes:    Normal Conjunctiva


ENT:    Normal External Ears, Nose and Mouth.


Neck:               Full range of motion. No meningismus.


Resp:   Clear to auscultation bilaterally


Cardio:   Regular rate and rhythm, no murmurs


Abd:    Soft, + midepigastric abdominal tenderness to palpation.  Mild 


distention.  No rebound or guarding.  Normal bowel sounds


Skin:   No petechiae or rashes


Back:   No midline or flank tenderness


Ext:    No cyanosis, or edema


Neur:   Awake and alert


Psych:    Normal Mood and Affect


Result Diagram:  


4/13/19 1805                                                                    


           4/13/19 1805





Results 24 hrs





Laboratory Tests


      Test
                                  4/13/19
18:05  4/13/19
18:34


      White Blood Count                       6.4 10^3/ul


      Red Blood Count                        4.76 10^6/ul


      Hemoglobin                                12.2 g/dl


      Hematocrit                                   38.3 %


      Mean Corpuscular Volume                     80.5 fl


      Mean Corpuscular Hemoglobin                 25.6 pg


      Mean Corpuscular Hemoglobin
Concent      31.9 g/dl 
  



      Red Cell Distribution Width                  14.3 %


      Platelet Count                          215 10^3/UL


      Mean Platelet Volume                        11.0 fl


      Immature Granulocytes %                     0.200 %


      Neutrophils %                                58.9 %


      Lymphocytes %                                27.9 %


      Monocytes %                                   8.5 %


      Eosinophils %                                 4.2 %


      Basophils %                                   0.3 %


      Nucleated Red Blood Cells %             0.0 /100WBC


      Immature Granulocytes #               0.010 10^3/ul


      Neutrophils #                           3.8 10^3/ul


      Lymphocytes #                           1.8 10^3/ul


      Monocytes #                             0.5 10^3/ul


      Eosinophils #                           0.3 10^3/ul


      Basophils #                             0.0 10^3/ul


      Nucleated Red Blood Cells #             0.0 10^3/ul


      Urine Color                          YELLOW


      Urine Clarity                        CLOUDY


      Urine pH                                        7.0


      Urine Specific Gravity                        1.015


      Urine Ketones                        NEGATIVE mg/dL


      Urine Nitrite                        NEGATIVE mg/dL


      Urine Bilirubin                      NEGATIVE mg/dL


      Urine Urobilinogen                   NEGATIVE mg/dL


      Urine Leukocyte Esterase
            NEGATIVE
Stephanie/ul  



      Urine Microscopic RBC                        0 /HPF


      Urine Microscopic WBC                        0 /HPF


      Urine Squamous Epithelial
Cells      FEW /HPF 
       



      Urine Bacteria                       FEW /HPF


      Urine Yeast (Budding)                FEW /HPF


      Urine Hemoglobin                     NEGATIVE mg/dL


      Urine Glucose                        NEGATIVE mg/dL


      Urine Total Protein                  NEGATIVE mg/dl


      Sodium Level                             141 mmol/L


      Potassium Level                          4.2 mmol/L


      Chloride Level                           103 mmol/L


      Carbon Dioxide Level                      30 mmol/L


      Anion Gap                                         8


      Blood Urea Nitrogen                        15 mg/dl


      Creatinine                               0.63 mg/dl


      Est Glomerular Filtrat Rate
mL/min   > 60 mL/min 
    



      Glucose Level                              89 mg/dl


      Calcium Level                             9.6 mg/dl


      Total Bilirubin                           0.5 mg/dl


      Direct Bilirubin                         0.00 mg/dl


      Indirect Bilirubin                        0.5 mg/dl


      Aspartate Amino Transf
(AST/SGOT)          21 IU/L 
  



      Alanine Aminotransferase
(ALT/SGPT)        18 IU/L 
  



      Alkaline Phosphatase                        64 IU/L


      Total Protein                              7.9 g/dl


      Albumin                                    4.5 g/dl


      Globulin                                  3.40 g/dl


      Albumin/Globulin Ratio                         1.32


      Lipase                                       77 U/L


      Beta HCG, Quantitative               < 2.4 mIU/ml


      POC Beta HCG, Qualitative                             NEGATIVE





Current Medications


 Medications
   Dose
          Sig/Zeke
       Start Time
   Status  Last


 (Trade)       Ordered        Route
 PRN     Stop Time              Admin
Dose


                              Reason                                Admin


 Sodium         1,000 ml @ 
   Q1H STAT
      4/13/19       DC           4/13/19


Chloride       1,000 mls/hr   IV
            18:21
                       18:40



                                             4/13/19 19:20


 Ketorolac
     30 mg          ONCE  STAT
    4/13/19       DC           4/13/19


Tromethamine
                 IV
            18:21
                       18:39



 (Toradol)                                   4/13/19 18:23


                0.5 mg         ONCE  STAT
    4/13/19       DC           4/13/19


Hydromorphone                 IV
            19:12
                       19:15



HCl
                                         4/13/19 19:13


(Dilaudid)








Procedures/MDM


LABS


CBC:      no e/o of systemic infection or severe anemia


CMP:      no e/o severe acidosis, alkalosis, renal failure, diabetic ketoac


idosis, liver disease


Lipase:      The patient's lipase is normal and indicative of no pancreatitis.


Urine:      no e/o acute infection or hematuria


Uhcg:      negative








12-lead EKG interpretation as interpeted by Dr. Campo 


Normal Sinus Rhythm with ventricular rate of 79 beats per minute


Normal axis


Normal intervals


No acute ST or T wave changes suggestive of acute ischemia or STEMI.





DIAGNOSTIC IMAGING:


                                        


PROCEDURE:   US right upper quadrant abdomen. 


 


CLINICAL INDICATION:   Abdominal pain


 


TECHNIQUE:   Multiple real-time images were acquired of the patient's right 


upper quadrant abdomen  utilizing a high resolution transducer. 


 


COMPARISON:   None


 


FINDINGS:


The liver demonstrates normal echogenicity and normal size without focal 


lesions. Patent portal vein. Gallbladder surgically absent. No intrahepatic or 


extrahepatic biliary dilatation.  The common bile duct measures 4.7 mm in 


maximal dimension.  The visualized portions of the pancreas are normal. The 


right kidney is normal size with normal echogenicity and morphology.  The right 


kidney measures 11.1 cm.  No hydronephrosis or perinephric fluid collections.  


There are no areas of increased echogenicity to suggest nephrolithiasis. Normal 


caliber aorta and IVC. No peritoneal free fluid.


 


IMPRESSION:


Normal right upper quadrant abdominal ultrasound. 


 





ED COURSE:


The patient was given IV fluids,Dilaudid, Toradol


The medication was well tolerated and the patient had market improvement in 


symptoms. 


The patient remained stable throughout ED course.








MEDICAL DECISION MAKING:


This is a 43-year-old female with history of previous cholecystectomy and 


biliary stent presents with midepigastric abdominal pain.  Differential 


diagnosis includes appendicitis, diverticulitis nephrolithais and other intra-


abdominal medical and surgical concerns. I have reviewed the patients lab 


studies and imaging as well as multiple examinations of the abdomen.   EKG as 


above is unremarkable. lab workup as above is unremarkable.  EKG as above is 


normal.  Lab workup and ultrasound is unremarkable.  Her pain was controlled  


status post IV fluids, Dilaudid, Toradol. She was given copies of her workup 


here and told to follow up with her PCP for referral to GI specialist. Was given


rx trial of Pepcid.  Patient also requested stronger pain medications.  I 


consulted [place] [employer] Database Assessment today which shows that patient 


does not have a history of narcotic abuse.  She is given a short Rx of Norco as 


well as Colace.  Strict return cautions discussed. 





PRESCRIPTIONS: Pepcid, Norco, Colace








SPECIALIST FOLLOW UP RECOMMENDED: GI specialist


Patient has been advised to follow up with primary care in 1-2 days.





Blood Pressure Assessment: Patient's blood pressure was elevated (>120/80) but 


appears stable without evidence of hypertension emergency or urgency.  The 


patient was counseled about the risks of hypertension and urged to pursue 


outpatient monitoring and therapy within a week with their primary care toño shah.








Smoking Cessation Therapy:  Pt. was lectured for greater than  3 minutes on the 


health risks of continued smoking and the benefits of cessation.





Departure


Diagnosis:  


   Primary Impression:  


   Abdominal pain


   Abdominal location:  epigastric  Qualified Codes:  R10.13 - Epigastric pain


Condition:  Stable


Patient Instructions:  Abdominal Pain


Referrals:  


[facility]


YOU HAVE RECEIVED A MEDICAL SCREENING EXAM AND THE RESULTS INDICATE THAT YOU DO 


NOT HAVE A CONDITION THAT REQUIRES URGENT TREATMENT IN THE EMERGENCY DEPARTMENT.





FURTHER EVALUATION AND TREATMENT OF YOUR CONDITION CAN WAIT UNTIL YOU ARE SEEN 


IN YOUR DOCTORS OFFICE WITHIN THE NEXT 1-2 DAYS. IT IS YOUR RESPONSIBILITY TO 


MAKE AN APPOINTMENT FOR FOLOW-UP CARE.





IF YOU HAVE A PRIMARY DOCTOR


--you should call your primary doctor and schedule an appointment





IF YOU DO NOT HAVE A PRIMARY DOCTOR YOU CAN CALL OUR PHYSICIAN REFERRAL HOTLINE 


AT


 [phone] 





IF YOU CAN NOT AFFORD TO SEE A PHYSICIAN YOU CAN CHOSE FROM THE FOLLOWING 


COMMUNITY CLINICS





[facility] [phone] [address] [facility]





[facility] [phone] [address] [facility]





[facility] [phone] [facility] [phone] [address] [facility] [phone] [address] [facility]. [phone] [address] [facility]. [facility]


YOU HAVE RECEIVED A MEDICAL SCREENING EXAM AND THE RESULTS INDICATE THAT YOU DO 


NOT HAVE A CONDITION THAT REQUIRES URGENT TREATMENT IN THE EMERGENCY DEPARTMENT.





FURTHER EVALUATION AND TREATMENT OF YOUR CONDITION CAN WAIT UNTIL YOU ARE SEEN 


IN YOUR DOCTORS OFFICE WITHIN THE NEXT 1-2 DAYS. IT IS YOUR RESPONSIBILITY TO 


MAKE AN APPOINTMENT FOR FOLOW-UP CARE.





IF YOU HAVE A PRIMARY DOCTOR


--you should call your primary doctor and schedule and appointment





IF YOU DO NOT HAVE A PRIMARY DOCTOR YOU CAN CALL OUR PHYSICIAN REFERRAL HOTLINE 


AT [phone].





IF YOU CAN NOT AFFORD TO SEE A PHYSICIAN YOU CAN CHOSE FROM THE FOLLOWING [facility]


INSTITUTIONS:





[facility]


[address] [place] [address]





[facility]


[address] [place] [address]





[facility]


[address] [place] [address]





[facility] URGENT CARE/SPECIALTIES





Additional Instructions:  


Please follow-up with your primary care provider for referral to a GI specialist


.  The next step would be to get endoscopy.  Your labs and imaging here have 


been normal.  Return here for any worsening pain, nausea, vomiting, fevers or 


any other complaints.











TAHMINA TOLEDO PA-C     Apr 13, 2019 20:10